# Patient Record
(demographics unavailable — no encounter records)

---

## 2025-04-18 NOTE — CONSULT LETTER
[Dear  ___] : Dear  [unfilled], [Consult Letter:] : I had the pleasure of evaluating your patient, [unfilled]. [Please see my note below.] : Please see my note below. [Consult Closing:] : Thank you very much for allowing me to participate in the care of this patient.  If you have any questions, please do not hesitate to contact me. [Sincerely,] : Sincerely, [FreeTextEntry3] : Hector Ansari MD tel: [phone] fax: [phone]

## 2025-04-18 NOTE — ASSESSMENT
[FreeTextEntry1] : 1. REFLUX:  Dietary and lifestyle mofification. EGD planned to exclude Barretts  2. Guaiac positive stools: COlonoscopy planned  Pertinent available records reviewed Risks of the procedures including but not limited to bleeding / perforation / infection / anesthesia complication / missed  lesions explained to the  patient . The patient expressed understanding and a desire to proceed with the procedures.  Risk of not doing procedures includes but is not limited to missed or delayed diagnosis of gastric pathology, colon cancer or other gastrointestinal pathology  A consultation note was provided to the referring provider

## 2025-04-18 NOTE — HISTORY OF PRESENT ILLNESS
[FreeTextEntry1] : JOSETTE FERNANDEZ  is being evaluated at the request of Dr. Galan  for an opinion re: reflux and guaiac positive stools. Reports colonoscopy > 10 years ago.  Denies nausea, vomiting, fever, chills, diarrhea, constipation, melena, hematemesis, BRBPR

## 2025-04-18 NOTE — PHYSICAL EXAM
[Alert] : alert [Sclera] : the sclera and conjunctiva were normal [Normal Appearance] : the appearance of the neck was normal [None] : no edema [Abdomen Soft] : soft [Abnormal Walk] : normal gait [Normal Color / Pigmentation] : normal skin color and pigmentation [No Focal Deficits] : no focal deficits [de-identified] : Deferred pending colonoscopy